# Patient Record
Sex: FEMALE | Race: WHITE | NOT HISPANIC OR LATINO | Employment: OTHER | URBAN - METROPOLITAN AREA
[De-identification: names, ages, dates, MRNs, and addresses within clinical notes are randomized per-mention and may not be internally consistent; named-entity substitution may affect disease eponyms.]

---

## 2020-11-09 ENCOUNTER — TRANSCRIBE ORDERS (OUTPATIENT)
Dept: ADMINISTRATIVE | Facility: HOSPITAL | Age: 70
End: 2020-11-09

## 2020-11-09 ENCOUNTER — HOSPITAL ENCOUNTER (OUTPATIENT)
Dept: RADIOLOGY | Facility: HOSPITAL | Age: 70
Discharge: HOME | End: 2020-11-09
Payer: MEDICARE

## 2020-11-09 DIAGNOSIS — M54.2 NECK PAIN: Primary | ICD-10-CM

## 2020-11-09 DIAGNOSIS — M54.2 NECK PAIN: ICD-10-CM

## 2020-11-09 PROCEDURE — 72050 X-RAY EXAM NECK SPINE 4/5VWS: CPT

## 2020-12-26 ENCOUNTER — PREP FOR PROCEDURE (OUTPATIENT)
Dept: GASTROENTEROLOGY | Facility: CLINIC | Age: 70
End: 2020-12-26

## 2020-12-26 DIAGNOSIS — Z12.11 SCREENING FOR COLON CANCER: Primary | ICD-10-CM

## 2021-01-20 ENCOUNTER — ANESTHESIA (OUTPATIENT)
Dept: GASTROENTEROLOGY | Facility: AMBULATORY SURGERY CENTER | Age: 71
End: 2021-01-20
Payer: MEDICARE

## 2021-01-20 ENCOUNTER — ANESTHESIA EVENT (OUTPATIENT)
Dept: GASTROENTEROLOGY | Facility: AMBULATORY SURGERY CENTER | Age: 71
End: 2021-01-20

## 2021-01-20 ENCOUNTER — HOSPITAL ENCOUNTER (OUTPATIENT)
Dept: GASTROENTEROLOGY | Facility: AMBULATORY SURGERY CENTER | Age: 71
Discharge: HOME | End: 2021-01-20
Payer: MEDICARE

## 2021-01-20 VITALS
DIASTOLIC BLOOD PRESSURE: 77 MMHG | TEMPERATURE: 96.8 F | OXYGEN SATURATION: 95 % | HEART RATE: 72 BPM | RESPIRATION RATE: 18 BRPM | SYSTOLIC BLOOD PRESSURE: 112 MMHG

## 2021-01-20 DIAGNOSIS — Z12.11 SCREENING FOR COLON CANCER: ICD-10-CM

## 2021-01-20 PROBLEM — E78.5 HYPERLIPIDEMIA: Status: ACTIVE | Noted: 2021-01-20

## 2021-01-20 PROBLEM — F39 MOOD DISORDER (HCC): Status: ACTIVE | Noted: 2021-01-20

## 2021-01-20 PROCEDURE — 00812 ANES LWR INTST SCR COLSC: CPT | Performed by: NURSE ANESTHETIST, CERTIFIED REGISTERED

## 2021-01-20 PROCEDURE — G0121 COLON CA SCRN NOT HI RSK IND: HCPCS | Performed by: INTERNAL MEDICINE

## 2021-01-20 RX ORDER — PROPOFOL 10 MG/ML
INJECTION, EMULSION INTRAVENOUS AS NEEDED
Status: DISCONTINUED | OUTPATIENT
Start: 2021-01-20 | End: 2021-01-20

## 2021-01-20 RX ORDER — SODIUM CHLORIDE 9 MG/ML
20 INJECTION, SOLUTION INTRAVENOUS CONTINUOUS
Status: DISCONTINUED | OUTPATIENT
Start: 2021-01-20 | End: 2021-01-24 | Stop reason: HOSPADM

## 2021-01-20 RX ORDER — ATORVASTATIN CALCIUM 10 MG/1
10 TABLET, FILM COATED ORAL DAILY
COMMUNITY

## 2021-01-20 RX ORDER — ESCITALOPRAM OXALATE 20 MG/1
20 TABLET ORAL DAILY
COMMUNITY

## 2021-01-20 RX ORDER — DIPHENOXYLATE HYDROCHLORIDE AND ATROPINE SULFATE 2.5; .025 MG/1; MG/1
1 TABLET ORAL DAILY
COMMUNITY

## 2021-01-20 RX ORDER — SODIUM CHLORIDE 9 MG/ML
INJECTION, SOLUTION INTRAVENOUS CONTINUOUS PRN
Status: DISCONTINUED | OUTPATIENT
Start: 2021-01-20 | End: 2021-01-20

## 2021-01-20 RX ADMIN — PROPOFOL 150 MG: 10 INJECTION, EMULSION INTRAVENOUS at 09:55

## 2021-01-20 RX ADMIN — PROPOFOL 150 MG: 10 INJECTION, EMULSION INTRAVENOUS at 09:49

## 2021-01-20 RX ADMIN — PROPOFOL 100 MG: 10 INJECTION, EMULSION INTRAVENOUS at 10:00

## 2021-01-20 RX ADMIN — SODIUM CHLORIDE: 9 INJECTION, SOLUTION INTRAVENOUS at 09:46

## 2021-01-20 NOTE — DISCHARGE INSTRUCTIONS
High Fiber Diet   WHAT YOU NEED TO KNOW:   What is a high-fiber diet? A high-fiber diet includes foods that have a high amount of fiber  Fiber is the part of fruits, vegetables, and grains that is not broken down by your body  Fiber keeps your bowel movements regular  Fiber can also help lower your cholesterol level, control blood sugar in people with diabetes, and relieve constipation  Fiber can also help you control your weight because it helps you feel full faster  Most adults should eat 25 to 35 grams of fiber each day  Talk to your dietitian or healthcare provider about the amount of fiber you need  What foods are good sources of fiber? · Foods with at least 4 grams of fiber per serving:      ? ? to ½ cup of high-fiber cereal (check the nutrition label on the box)    ? ½ cup of blackberries or raspberries    ? 4 dried prunes    ? 1 cooked artichoke    ? ½ cup of cooked legumes, such as lentils, or red, kidney, and freed beans    · Foods with 1 to 3 grams of fiber per serving:      ? 1 slice of whole-wheat, pumpernickel, or rye bread    ? ½ cup of cooked brown rice    ? 4 whole-wheat crackers    ? 1 cup of oatmeal    ? ½ cup of cereal with 1 to 3 grams of fiber per serving (check the nutrition label on the box)    ? 1 small piece of fruit, such as an apple, banana, pear, kiwi, or orange    ? 3 dates    ? ½ cup of canned apricots, fruit cocktail, peaches, or pears    ? ½ cup of raw or cooked vegetables, such as carrots, cauliflower, cabbage, spinach, squash, or corn  What are some ways that I can increase fiber in my diet? · Choose brown or wild rice instead of white rice  · Use whole wheat flour in recipes instead of white or all-purpose flour  · Add beans and peas to casseroles or soups  · Choose fresh fruit and vegetables with peels or skins on instead of juices  What other guidelines should I follow? · Add fiber to your diet slowly    You may have abdominal discomfort, bloating, and gas if you add fiber to your diet too quickly  · Drink plenty of liquids as you add fiber to your diet  You may have nausea or develop constipation if you do not drink enough water  Ask how much liquid to drink each day and which liquids are best for you  CARE AGREEMENT:   You have the right to help plan your care  Discuss treatment options with your healthcare provider to decide what care you want to receive  You always have the right to refuse treatment  The above information is an  only  It is not intended as medical advice for individual conditions or treatments  Talk to your doctor, nurse or pharmacist before following any medical regimen to see if it is safe and effective for you  © Copyright [address] Information is for End User's use only and may not be sold, redistributed or otherwise used for commercial purposes  All illustrations and images included in CareNotes® are the copyrighted property of [employer]  or [facility]   Hemorrhoids   WHAT YOU NEED TO KNOW:   What are hemorrhoids? Hemorrhoids are swollen blood vessels inside your rectum (internal hemorrhoids) or on your anus (external hemorrhoids)  Sometimes a hemorrhoid may prolapse  This means it extends out of your anus  What increases my risk for hemorrhoids? · Pregnancy or obesity    · Straining or sitting for a long time during bowel movements    · Liver disease    · Weak muscles around the anus caused by older age, rectal surgery, or anal intercourse    · A lack of physical activity    · Chronic diarrhea or constipation    · A low-fiber diet    What are the signs and symptoms of hemorrhoids?    · Pain or itching around your anus or inside your rectum    · Swelling or bumps around your anus    · Bright red blood in your bowel movement, on the toilet paper, or in the toilet bowl    · Tissue bulging out of your anus (prolapsed hemorrhoids)    · Incontinence (poor control over urine or bowel movements)    How are hemorrhoids diagnosed? Your healthcare provider will ask about your symptoms, the foods you eat, and your bowel movements  He or she will examine your anus for external hemorrhoids  You may need the following:  · A digital rectal exam  is a test to check for hemorrhoids  Your healthcare provider will put a gloved finger inside your anus to feel for the hemorrhoids  · An anoscopy  is a test that uses a scope (small tube with a light and camera on the end) to look at your hemorrhoids  How are hemorrhoids treated? Treatment will depend on your symptoms  You may need any of the following:  · Medicines  can help decrease pain and swelling, and soften your bowel movement  The medicine may be a pill, pad, cream, or ointment  · Procedures  may be used to shrink or remove your hemorrhoid  Examples include rubber-band ligation, sclerotherapy, and photocoagulation  These procedures may be done in your healthcare provider's office  Ask your healthcare provider for more information about these procedures  · Surgery  may be needed to shrink or remove your hemorrhoids  How can I manage my symptoms? · Apply ice on your anus for 15 to 20 minutes every hour or as directed  Use an ice pack, or put crushed ice in a plastic bag  Cover it with a towel before you apply it to your anus  Ice helps prevent tissue damage and decreases swelling and pain  · Take a sitz bath  Fill a bathtub with 4 to 6 inches of warm water  You may also use a sitz bath pan that fits inside a toilet bowl  Sit in the sitz bath for 15 minutes  Do this 3 times a day, and after each bowel movement  The warm water can help decrease pain and swelling  · Keep your anal area clean  Gently wash the area with warm water daily  Soap may irritate the area  After a bowel movement, wipe with moist towelettes or wet toilet paper  Dry toilet paper can irritate the area  How can I help prevent hemorrhoids?    · Do not strain to have a bowel movement  Do not sit on the toilet too long  These actions can increase pressure on the tissues in your rectum and anus  · Drink plenty of liquids  Liquids can help prevent constipation  Ask how much liquid to drink each day and which liquids are best for you  · Eat a variety of high-fiber foods  Examples include fruits, vegetables, and whole grains  Ask your healthcare provider how much fiber you need each day  You may need to take a fiber supplement  · Exercise as directed  Exercise, such as walking, may make it easier to have a bowel movement  Ask your healthcare provider to help you create an exercise plan  · Do not have anal sex  Anal sex can weaken the skin around your rectum and anus  · Avoid heavy lifting  This can cause straining and increase your risk for another hemorrhoid  When should I seek immediate care? · You have severe pain in your rectum or around your anus  · You have severe pain in your abdomen and you are vomiting  · You have bleeding from your anus that soaks through your underwear  When should I contact my healthcare provider? · You have frequent and painful bowel movements  · Your hemorrhoid looks or feels more swollen than usual      · You do not have a bowel movement for 2 days or more  · You see or feel tissue coming through your anus  · You have questions or concerns about your condition or care  CARE AGREEMENT:   You have the right to help plan your care  Learn about your health condition and how it may be treated  Discuss treatment options with your healthcare providers to decide what care you want to receive  You always have the right to refuse treatment  The above information is an  only  It is not intended as medical advice for individual conditions or treatments  Talk to your doctor, nurse or pharmacist before following any medical regimen to see if it is safe and effective for you    © Copyright [address] Information is for [employer] use only and may not be sold, redistributed or otherwise used for commercial purposes  All illustrations and images included in CareNotes® are the copyrighted property of [employer] M [employer] Inc  or [employer]  Diverticulosis   WHAT YOU NEED TO KNOW:   What is diverticulosis? Diverticulosis is a condition that causes small pockets called diverticula to form in your intestine  These pockets make it difficult for bowel movements to pass through your digestive system  What causes diverticulosis? Diverticula form when muscles have to work hard to move bowel movements through the intestine  The force causes bulges to form at weak areas in the intestine  This may happen if you eat foods that are low in fiber  Fiber helps give your bowel movements more bulk so they are larger and easier to move through your colon  The following may increase your risk of diverticulosis:  · A history of constipation    · Age 36 or older    · Obesity    · Lack of exercise    What are the signs and symptoms of diverticulosis? Diverticulosis usually does not cause any signs or symptoms  It may cause any of the following in some people:  · Pain or discomfort in your lower abdomen    · Abdominal bloating    · Constipation or diarrhea    How is diverticulosis diagnosed? Your healthcare provider will examine you and ask about your bowel movements, diet, and symptoms  He or she will also ask about any medical conditions you have or medicines you take  You may need any of the following:  · Blood tests  may be done to check for signs of inflammation  · A barium enema  is an x-ray of your colon that may show diverticula  A tube is put into your anus, and a liquid called barium is put through the tube  Barium is used so that healthcare providers can see your colon more clearly  · Flexible sigmoidoscopy  is a test to look for any changes in your lower intestines and rectum   It may also show the cause of any bleeding or pain  A soft, bendable tube with a light on the end will be put into your anus  It will then be moved forward into your intestine  · A colonoscopy  is used to look at your whole colon  A scope (long bendable tube with a light on the end) is used to take pictures  This test may show diverticula  · A CT scan , or CAT scan, may show diverticula  You may be given contrast liquid before the scan  Tell the healthcare provider if you have ever had an allergic reaction to contrast liquid  How is diverticulosis managed? The goal of treatment is to manage any symptoms you have and prevent other problems such as diverticulitis  Diverticulitis is swelling or infection of the diverticula  Your healthcare provider may recommend any of the following:  · Eat a variety of high-fiber foods  High-fiber foods help you have regular bowel movements  High-fiber foods include cooked beans, fruits, vegetables, and some cereals  Most adults need 25 to 35 grams of fiber each day  Your healthcare provider may recommend that you have more  Ask your healthcare provider how much fiber you need  Increase fiber slowly  You may have abdominal discomfort, bloating, and gas if you add fiber to your diet too quickly  You may need to take a fiber supplement if you are not getting enough fiber from food  · Medicines  to soften your bowel movements may be given  You may also need medicines to treat symptoms such as bloating and pain  · Drink liquids as directed  You may need to drink 2 to 3 liters (8 to 12 cups) of liquids every day  Ask your healthcare provider how much liquid to drink each day and which liquids are best for you  · Apply heat  on your abdomen for 20 to 30 minutes every 2 hours for as many days as directed  Heat helps decrease pain and muscle spasms  How can I help prevent diverticulitis or other symptoms?   The following may help decrease your risk for diverticulitis or symptoms, such as bleeding  Talk to your provider about these or other things you can do to prevent problems that may occur with diverticulosis  · Exercise regularly  Ask your healthcare provider about the best exercise plan for you  Exercise can help you have regular bowel movements  Get 30 minutes of exercise on most days of the week  · Maintain a healthy weight  Ask your healthcare provider how much you should weigh  Ask him or her to help you create a weight loss plan if you are overweight  · Do not smoke  Nicotine and other chemicals in cigarettes increase your risk for diverticulitis  Ask your healthcare provider for information if you currently smoke and need help to quit  E-cigarettes or smokeless tobacco still contain nicotine  Talk to your healthcare provider before you use these products  · Ask your healthcare provider if it is safe to take NSAIDs  NSAIDs may increase your risk of diverticulitis  When should I seek immediate care? · You have severe pain on the left side of your lower abdomen  · Your bowel movements are bright or dark red  When should I contact my healthcare provider? · You have a fever and chills  · You feel dizzy or lightheaded  · You have nausea, or you are vomiting  · You have a change in your bowel movements  · You have questions or concerns about your condition or care  CARE AGREEMENT:   You have the right to help plan your care  Learn about your health condition and how it may be treated  Discuss treatment options with your healthcare providers to decide what care you want to receive  You always have the right to refuse treatment  The above information is an  only  It is not intended as medical advice for individual conditions or treatments  Talk to your doctor, nurse or pharmacist before following any medical regimen to see if it is safe and effective for you    © Copyright [employer] 2020 Information is for End User's use only and may not be sold, redistributed or otherwise used for commercial purposes   All illustrations and images included in CareNotes® are the copyrighted property of A D A M , Inc  or Judi Newton

## 2021-01-20 NOTE — H&P
History and Physical - [facility]  Bay Currie Naresh 79 y o  female MRN: [id number]                  HPI: Hilda Martinez is a 79y o  year old female who presents for colonoscopy for screening purposes last colonoscopy 2010      REVIEW OF SYSTEMS: Per the HPI, and otherwise unremarkable  Historical Information   Past Medical History:   Diagnosis Date    Diverticulitis large intestine several years    Hyperlipidemia      Past Surgical History:   Procedure Laterality Date    APPENDECTOMY      BREAST SURGERY      lrft breast bx    HYSTERECTOMY      TONSILLECTOMY       Social History   Social History     Substance and Sexual Activity   Alcohol Use Yes    Frequency: 2-3 times a week    Drinks per session: 1 or 2    Binge frequency: Never     Social History     Substance and Sexual Activity   Drug Use Never     Social History     Tobacco Use   Smoking Status Never Smoker   Smokeless Tobacco Never Used     History reviewed  No pertinent family history  Meds/Allergies       Current Outpatient Medications:     atorvastatin (LIPITOR) 10 mg tablet    escitalopram (LEXAPRO) 20 mg tablet    multivitamin (THERAGRAN) TABS    No Known Allergies    Objective     /83   Pulse 77   Temp (!) 96 8 °F (36 °C) (Skin)   Resp 18   SpO2 97%       PHYSICAL EXAM    Gen: NAD  CV: RRR  CHEST: Clear  ABD: soft, NT/ND  EXT: no edema      ASSESSMENT/PLAN:  This is a 79y o  year old female here for colonoscopy, and she is stable and optimized for her procedure

## 2021-01-20 NOTE — INTERVAL H&P NOTE
H&P reviewed  After examining the patient I find no changes in the patients condition since the H&P had been written      Vitals:    01/20/21 0904   BP: 143/83   Pulse: 77   Resp: 18   Temp: (!) 96 8 °F (36 °C)   SpO2: 97%

## 2021-01-20 NOTE — ANESTHESIA PREPROCEDURE EVALUATION
Procedure:  COLONOSCOPY    Relevant Problems   CARDIO   (+) Hyperlipidemia        Physical Exam    Airway    Mallampati score: II  TM Distance: >3 FB  Neck ROM: full     Dental   No notable dental hx     Cardiovascular  Rhythm: regular, Rate: normal,     Pulmonary  Breath sounds clear to auscultation,     Other Findings        Anesthesia Plan  ASA Score- 2     Anesthesia Type- general and IV sedation with anesthesia with ASA Monitors  Additional Monitors:   Airway Plan:           Plan Factors-    Chart reviewed  EKG reviewed  Existing labs reviewed  Patient summary reviewed  Patient is not a current smoker  Patient did not smoke on day of surgery  Induction- intravenous      Postoperative Plan-     Informed Consent-

## 2021-02-13 DIAGNOSIS — Z23 ENCOUNTER FOR IMMUNIZATION: ICD-10-CM

## 2021-02-15 ENCOUNTER — IMMUNIZATIONS (OUTPATIENT)
Dept: FAMILY MEDICINE CLINIC | Facility: HOSPITAL | Age: 71
End: 2021-02-15

## 2021-02-15 DIAGNOSIS — Z23 ENCOUNTER FOR IMMUNIZATION: Primary | ICD-10-CM

## 2021-02-15 PROCEDURE — 91300 SARS-COV-2 / COVID-19 MRNA VACCINE (PFIZER-BIONTECH) 30 MCG: CPT

## 2021-02-15 PROCEDURE — 0001A SARS-COV-2 / COVID-19 MRNA VACCINE (PFIZER-BIONTECH) 30 MCG: CPT

## 2021-03-08 ENCOUNTER — IMMUNIZATIONS (OUTPATIENT)
Dept: FAMILY MEDICINE CLINIC | Facility: HOSPITAL | Age: 71
End: 2021-03-08

## 2021-03-08 DIAGNOSIS — Z23 ENCOUNTER FOR IMMUNIZATION: Primary | ICD-10-CM

## 2021-03-08 PROCEDURE — 0002A SARS-COV-2 / COVID-19 MRNA VACCINE (PFIZER-BIONTECH) 30 MCG: CPT

## 2021-03-08 PROCEDURE — 91300 SARS-COV-2 / COVID-19 MRNA VACCINE (PFIZER-BIONTECH) 30 MCG: CPT

## 2021-04-26 DIAGNOSIS — B34.9 VIRAL SYNDROME: ICD-10-CM

## 2021-04-26 PROCEDURE — U0003 INFECTIOUS AGENT DETECTION BY NUCLEIC ACID (DNA OR RNA); SEVERE ACUTE RESPIRATORY SYNDROME CORONAVIRUS 2 (SARS-COV-2) (CORONAVIRUS DISEASE [COVID-19]), AMPLIFIED PROBE TECHNIQUE, MAKING USE OF HIGH THROUGHPUT TECHNOLOGIES AS DESCRIBED BY CMS-2020-01-R: HCPCS | Performed by: INTERNAL MEDICINE

## 2021-04-26 PROCEDURE — U0005 INFEC AGEN DETEC AMPLI PROBE: HCPCS | Performed by: INTERNAL MEDICINE

## 2021-04-27 LAB — SARS-COV-2 RNA RESP QL NAA+PROBE: POSITIVE

## 2021-09-14 ENCOUNTER — OFFICE VISIT (OUTPATIENT)
Dept: URGENT CARE | Facility: CLINIC | Age: 71
End: 2021-09-14
Payer: MEDICARE

## 2021-09-14 VITALS
DIASTOLIC BLOOD PRESSURE: 70 MMHG | BODY MASS INDEX: 26.4 KG/M2 | SYSTOLIC BLOOD PRESSURE: 141 MMHG | HEIGHT: 63 IN | RESPIRATION RATE: 16 BRPM | WEIGHT: 149 LBS | HEART RATE: 68 BPM

## 2021-09-14 DIAGNOSIS — M54.40 BACK PAIN OF LUMBAR REGION WITH SCIATICA: Primary | ICD-10-CM

## 2021-09-14 PROCEDURE — 99213 OFFICE O/P EST LOW 20 MIN: CPT | Performed by: PHYSICIAN ASSISTANT

## 2021-09-14 PROCEDURE — G0463 HOSPITAL OUTPT CLINIC VISIT: HCPCS | Performed by: PHYSICIAN ASSISTANT

## 2021-09-14 RX ORDER — LIDOCAINE 50 MG/G
1 PATCH TOPICAL DAILY
Qty: 15 PATCH | Refills: 0 | Status: SHIPPED | OUTPATIENT
Start: 2021-09-14

## 2021-09-14 RX ORDER — METHOCARBAMOL 500 MG/1
500 TABLET, FILM COATED ORAL
Qty: 14 TABLET | Refills: 0 | Status: SHIPPED | OUTPATIENT
Start: 2021-09-14

## 2021-09-14 NOTE — PROGRESS NOTES
[facility]        NAME: Israel Mina is a 70 y o  female  : 1950    MRN: [id number]  DATE: 2021  TIME: 9:02 AM    Assessment and Plan   Back pain of lumbar region with sciatica [M54 40]  1  Back pain of lumbar region with sciatica  methocarbamol (ROBAXIN) 500 mg tablet    Ambulatory Referral to Comprehensive Spine Program         Patient Instructions      Began taking muscle relaxer  Only take before bedtime as it can cause drowsiness   continue with over-the-counter medication alternating between Tylenol ibuprofen as needed help with pain  Gentle range-of-motion stretching, heat to the area, gentle massage  Follow-up with primary care physician 3-5 days for continue symptoms  Follow-up with comprehensive spine program as discussed for continued symptoms  Follow up with PCP in 3-5 days  Proceed to  ER if symptoms worsen  Chief Complaint     Chief Complaint   Patient presents with    Back Pain     Starts in lower L back and radiates down buttocks and leg         History of Present Illness       70year old female presents to the office for c/o of left sided back pain with radiation down the back of the left leg  Patient denies any specific injury to the area  She does note she was working out in [employer]  She has been trying to search the area as well as using over-the-counter NSAIDs with mild relief  Patient notes mild numbness in distribution of the pain at times   notes mild difficulty with gait  Denies any loss of bowel or bladder function, fevers or chills  Otherwise is feeling well     Back Pain  This is a new problem  The current episode started in the past 7 days (x4 days )  The problem occurs daily  The pain is present in the gluteal and lumbar spine  The quality of the pain is described as aching  Radiates to: posterior left leg  The pain is at a severity of 7/10  The symptoms are aggravated by bending  Associated symptoms include numbness   Pertinent negatives include no abdominal pain, bladder incontinence, bowel incontinence, chest pain, fever, paresthesias, perianal numbness or tingling  She has tried NSAIDs, ice and heat for the symptoms  The treatment provided mild relief  Review of Systems   Review of Systems   Constitutional: Negative for chills and fever  Respiratory: Negative for cough, chest tightness and shortness of breath  Cardiovascular: Negative for chest pain and palpitations  Gastrointestinal: Negative for abdominal pain and bowel incontinence  Genitourinary: Negative  Negative for bladder incontinence  Musculoskeletal: Positive for back pain, gait problem (limp at times ) and myalgias  Skin: Negative  Neurological: Positive for numbness  Negative for tingling and paresthesias  Current Medications       Current Outpatient Medications:     atorvastatin (LIPITOR) 10 mg tablet, Take 10 mg by mouth daily, Disp: , Rfl:     escitalopram (LEXAPRO) 20 mg tablet, Take 20 mg by mouth daily, Disp: , Rfl:     multivitamin (THERAGRAN) TABS, Take 1 tablet by mouth daily, Disp: , Rfl:     methocarbamol (ROBAXIN) 500 mg tablet, Take 1 tablet (500 mg total) by mouth daily at bedtime as needed for muscle spasms (back pain), Disp: 14 tablet, Rfl: 0    Current Allergies     Allergies as of 09/14/2021    (No Known Allergies)            The following portions of the patient's history were reviewed and updated as appropriate: allergies, current medications, past family history, past medical history, past social history, past surgical history and problem list      Past Medical History:   Diagnosis Date    Diverticulitis large intestine several years    Hyperlipidemia        Past Surgical History:   Procedure Laterality Date    APPENDECTOMY      BREAST SURGERY      lrft breast bx    HYSTERECTOMY      TONSILLECTOMY         No family history on file  Medications have been verified          Objective   /70 (Patient Position: Sitting)   Pulse 68   Resp 16   Ht 5' 3" (1 6 m)   Wt 67 6 kg (149 lb)   BMI 26 39 kg/m²   No LMP recorded  Patient has had a hysterectomy  Physical Exam     Physical Exam  Vitals and nursing note reviewed  Constitutional:       General: She is not in acute distress  Appearance: She is well-developed  HENT:      Head: Normocephalic and atraumatic  Right Ear: Hearing and external ear normal       Left Ear: Hearing and external ear normal       Nose: Nose normal    Eyes:      Conjunctiva/sclera: Conjunctivae normal       Pupils: Pupils are equal, round, and reactive to light  Cardiovascular:      Rate and Rhythm: Normal rate and regular rhythm  Pulses: Normal pulses  Heart sounds: Normal heart sounds  No murmur heard  No friction rub  No gallop  Pulmonary:      Effort: Pulmonary effort is normal  No respiratory distress  Breath sounds: Normal breath sounds  No wheezing or rales  Musculoskeletal:         General: No tenderness or deformity  Normal range of motion  Cervical back: Normal range of motion  Lumbar back: Spasms present  No swelling, lacerations or bony tenderness  Normal range of motion  Negative right straight leg raise test and negative left straight leg raise test         Back:       Comments:  Patient able walk on toes and on heels without difficulty  Straight leg raise negative bilaterally  Sensation intact to crude touch in bilateral lower extremities  5/5 strength in bilateral lower extremities   Skin:     General: Skin is warm and dry  Capillary Refill: Capillary refill takes less than 2 seconds  Findings: No ecchymosis, erythema or laceration  Neurological:      Mental Status: She is alert  Sensory: No sensory deficit  Motor: No abnormal muscle tone  Deep Tendon Reflexes: Reflexes normal    Psychiatric:         Behavior: Behavior is cooperative

## 2021-09-14 NOTE — PATIENT INSTRUCTIONS
Began taking muscle relaxer  Only take before bedtime as it can cause drowsiness   continue with over-the-counter medication alternating between Tylenol ibuprofen as needed help with pain  Gentle range-of-motion stretching, heat to the area, gentle massage  Follow-up with primary care physician 3-5 days for continue symptoms  Follow-up with comprehensive spine program as discussed for continued symptoms  Sciatica   WHAT YOU NEED TO KNOW:   Sciatica is a condition that causes pain along your sciatic nerve  The sciatic nerve runs from your spine through both sides of your buttocks  It then runs down the back of your thigh, into your lower leg and foot  Your sciatic nerve may be compressed, inflamed, irritated, or stretched  DISCHARGE INSTRUCTIONS:   Medicines:   · NSAIDs:  These medicines decrease swelling and pain  NSAIDs are available without a doctor's order  Ask your healthcare provider which medicine is right for you  Ask how much to take and when to take it  Take as directed  NSAIDs can cause stomach bleeding or kidney problems if not taken correctly  · Acetaminophen: This medicine decreases pain  Acetaminophen is available without a doctor's order  Ask how much to take and when to take it  Follow directions  Acetaminophen can cause liver damage if not taken correctly  · Muscle relaxers  help decrease pain and muscle spasms  · Take your medicine as directed  Contact your healthcare provider if you think your medicine is not helping or if you have side effects  Tell him of her if you are allergic to any medicine  Keep a list of the medicines, vitamins, and herbs you take  Include the amounts, and when and why you take them  Bring the list or the pill bottles to follow-up visits  Carry your medicine list with you in case of an emergency  Follow up with your healthcare provider as directed:  Write down your questions so you remember to ask them during your visits     Manage your symptoms:   · Activity:  Decrease your activity  Do not lift heavy objects or twist your back for at least 6 weeks  Slowly return to your usual activity  · Ice:  Ice helps decrease swelling and pain  Ice may also help prevent tissue damage  Use an ice pack, or put crushed ice in a plastic bag  Cover it with a towel and place it on your low back or leg for 15 to 20 minutes every hour or as directed  · Heat:  Heat helps decrease pain and muscle spasms  Apply heat on the area for 20 to 30 minutes every 2 hours for as many days as directed  · Physical therapy:  You may need to see physical therapist to teach you exercises to help improve movement and strength, and to decrease pain  An occupational therapist teaches you skills to help with your daily activities  · Use assistive devices if directed: You may need to wear back support, such as a back brace  You may need crutches, a cane, or a walker to decrease stress on your lower back and leg muscles  Ask your healthcare provider for more information about assistive devices and how to use them correctly  Self-care:   · Avoid pressure on your back and legs:  Do not  lift heavy objects, or stand or sit for long periods of time  · Lift objects safely:  Keep your back straight and bend your knees when you  an object  Do not bend or twist your back when you lift  · Maintain a healthy weight:  Ask your healthcare provider how much you should weigh  Ask him to help you create a weight loss plan if you are overweight  · Exercise:  Ask your healthcare provider about the best stretching, warmup, and exercise plan for you  Contact your healthcare provider if:   · You have pain in your lower back at night or when resting  · You have pain in your lower back with numbness below the knee  · You have weakness in one leg only  · You have questions or concerns about your condition or care      Return to the emergency department if:   · You have trouble holding back your urine or bowel movements  · You have weakness in both legs  · You have numbness in your groin or buttocks  © Copyright [employer] 2021 Information is for End User's use only and may not be sold, redistributed or otherwise used for commercial purposes  All illustrations and images included in CareNotes® are the copyrighted property of [employer]  or Judi Rose   The above information is an  only  It is not intended as medical advice for individual conditions or treatments  Talk to your doctor, nurse or pharmacist before following any medical regimen to see if it is safe and effective for you  Lower Back Exercises   WHAT YOU NEED TO KNOW:   Lower back exercises help heal and strengthen your back muscles to prevent another injury  Ask your healthcare provider if you need to see a physical therapist for more advanced exercises  DISCHARGE INSTRUCTIONS:   Return to the emergency department if:   · You have severe pain that prevents you from moving  Contact your healthcare provider if:   · Your pain becomes worse  · You have new pain  · You have questions or concerns about your condition or care  Do lower back exercises safely:   · Do the exercises on a mat or firm surface  (not on a bed) to support your spine and prevent low back pain  · Move slowly and smoothly  Avoid fast or jerky motions  · Breathe normally  Do not hold your breath  · Stop if you feel pain  It is normal to feel some discomfort at first  Regular exercise will help decrease your discomfort over time  Lower back exercises: Your healthcare provider may recommend that you do back exercises 10 to 30 minutes each day  He may also recommend that you do exercises 1 to 3 times each day  Ask your healthcare provider which exercises are best for you and how often to do them  · Ankle pumps:  Lie on your back   Move your foot up (with your toes pointing toward your head)  Then, move your foot down (with your toes pointing away from you)  Repeat this exercise 10 times on each side  · Heel slides:  Lie on your back  Slowly bend one leg and then straighten it  Next, bend the other leg and then straighten it  Repeat 10 times on each side  · Pelvic tilt:  Lie on your back with your knees bent and feet flat on the floor  Place your arms in a relaxed position beside your body  Tighten the muscles of your abdomen and flatten your back against the floor  Hold for 5 seconds  Repeat 5 times  · Back stretch:  Lie on your back with your hands behind your head  Bend your knees and turn the lower half of your body to one side  Hold this position for 10 seconds  Repeat 3 times on each side  · Straight leg raises:  Lie on your back with one leg straight  Bend the other knee  Tighten your abdomen and then slowly lift the straight leg up about 6 to 12 inches off the floor  Hold for 1 to 5 seconds  Lower your leg slowly  Repeat 10 times on each leg  · Knee-to-chest:  Lie on your back with your knees bent and feet flat on the floor  Pull one of your knees toward your chest and hold it there for 5 seconds  Return your leg to the starting position  Lift the other knee toward your chest and hold for 5 seconds  Do this 5 times on each side  · Cat and camel:  Place your hands and knees on the floor  Arch your back upward toward the ceiling and lower your head  Round out your spine as much as you can  Hold for 5 seconds  Lift your head upward and push your chest downward toward the floor  Hold for 5 seconds  Do 3 sets or as directed  · Wall squats:  Stand with your back against a wall  Tighten the muscles of your abdomen  Slowly lower your body until your knees are bent at a 45 degree angle  Hold this position for 5 seconds  Slowly move back up to a standing position  Repeat 10 times           · Curl up:  Lie on your back with your knees bent and feet flat on the floor  Place your hands, palms down, underneath the curve in your lower back  Next, with your elbows on the floor, lift your shoulders and chest 2 to 3 inches  Keep your head in line with your shoulders  Hold this position for 5 seconds  When you can do this exercise without pain for 10 to 15 seconds, you may add a rotation  While your shoulders and chest are lifted off the ground, turn slightly to the left and hold  Repeat on the other side  · Bird dog:  Place your hands and knees on the floor  Keep your wrists directly below your shoulders and your knees directly below your hips  Pull your belly button in toward your spine  Do not flatten or arch your back  Tighten your abdominal muscles  Raise one arm straight out so that it is aligned with your head  Next, raise the leg opposite your arm  Hold this position for 15 seconds  Lower your arm and leg slowly and change sides  Do 5 sets  © Copyright [employer] 2021 Information is for End User's use only and may not be sold, redistributed or otherwise used for commercial purposes  All illustrations and images included in CareNotes® are the copyrighted property of [employer]  or Judi Rose   The above information is an  only  It is not intended as medical advice for individual conditions or treatments  Talk to your doctor, nurse or pharmacist before following any medical regimen to see if it is safe and effective for you

## 2021-09-27 ENCOUNTER — TELEPHONE (OUTPATIENT)
Dept: PHYSICAL THERAPY | Facility: OTHER | Age: 71
End: 2021-09-27

## 2021-09-27 ENCOUNTER — APPOINTMENT (OUTPATIENT)
Dept: LAB | Facility: CLINIC | Age: 71
End: 2021-09-27
Payer: MEDICARE

## 2021-09-27 ENCOUNTER — HOSPITAL ENCOUNTER (OUTPATIENT)
Dept: RADIOLOGY | Facility: HOSPITAL | Age: 71
Discharge: HOME | End: 2021-09-27
Payer: MEDICARE

## 2021-09-27 DIAGNOSIS — M54.9 BACK PAIN, UNSPECIFIED BACK LOCATION, UNSPECIFIED BACK PAIN LATERALITY, UNSPECIFIED CHRONICITY: ICD-10-CM

## 2021-09-27 DIAGNOSIS — E78.5 HYPERLIPIDEMIA, UNSPECIFIED HYPERLIPIDEMIA TYPE: ICD-10-CM

## 2021-09-27 DIAGNOSIS — Z00.00 ROUTINE GENERAL MEDICAL EXAMINATION AT A HEALTH CARE FACILITY: ICD-10-CM

## 2021-09-27 DIAGNOSIS — E03.9 HYPOTHYROIDISM, ADULT: ICD-10-CM

## 2021-09-27 LAB
ALBUMIN SERPL BCP-MCNC: 4.1 G/DL (ref 3.5–5)
ALP SERPL-CCNC: 74 U/L (ref 46–116)
ALT SERPL W P-5'-P-CCNC: 35 U/L (ref 12–78)
ANION GAP SERPL CALCULATED.3IONS-SCNC: 7 MMOL/L (ref 4–13)
AST SERPL W P-5'-P-CCNC: 23 U/L (ref 5–45)
BASOPHILS # BLD AUTO: 0.03 THOUSANDS/ΜL (ref 0–0.1)
BASOPHILS NFR BLD AUTO: 1 % (ref 0–1)
BILIRUB SERPL-MCNC: 0.68 MG/DL (ref 0.2–1)
BUN SERPL-MCNC: 21 MG/DL (ref 5–25)
CALCIUM SERPL-MCNC: 8.8 MG/DL (ref 8.3–10.1)
CHLORIDE SERPL-SCNC: 107 MMOL/L (ref 100–108)
CHOLEST SERPL-MCNC: 177 MG/DL (ref 50–200)
CO2 SERPL-SCNC: 28 MMOL/L (ref 21–32)
CREAT SERPL-MCNC: 0.82 MG/DL (ref 0.6–1.3)
EOSINOPHIL # BLD AUTO: 0.14 THOUSAND/ΜL (ref 0–0.61)
EOSINOPHIL NFR BLD AUTO: 3 % (ref 0–6)
ERYTHROCYTE [DISTWIDTH] IN BLOOD BY AUTOMATED COUNT: 12.4 % (ref 11.6–15.1)
GFR SERPL CREATININE-BSD FRML MDRD: 72 ML/MIN/1.73SQ M
GLUCOSE P FAST SERPL-MCNC: 99 MG/DL (ref 65–99)
HCT VFR BLD AUTO: 40.5 % (ref 34.8–46.1)
HDLC SERPL-MCNC: 69 MG/DL
HGB BLD-MCNC: 13.2 G/DL (ref 11.5–15.4)
IMM GRANULOCYTES # BLD AUTO: 0.02 THOUSAND/UL (ref 0–0.2)
IMM GRANULOCYTES NFR BLD AUTO: 0 % (ref 0–2)
LDLC SERPL CALC-MCNC: 96 MG/DL (ref 0–100)
LYMPHOCYTES # BLD AUTO: 1.29 THOUSANDS/ΜL (ref 0.6–4.47)
LYMPHOCYTES NFR BLD AUTO: 29 % (ref 14–44)
MCH RBC QN AUTO: 31.2 PG (ref 26.8–34.3)
MCHC RBC AUTO-ENTMCNC: 32.6 G/DL (ref 31.4–37.4)
MCV RBC AUTO: 96 FL (ref 82–98)
MONOCYTES # BLD AUTO: 0.34 THOUSAND/ΜL (ref 0.17–1.22)
MONOCYTES NFR BLD AUTO: 8 % (ref 4–12)
NEUTROPHILS # BLD AUTO: 2.7 THOUSANDS/ΜL (ref 1.85–7.62)
NEUTS SEG NFR BLD AUTO: 59 % (ref 43–75)
NONHDLC SERPL-MCNC: 108 MG/DL
NRBC BLD AUTO-RTO: 0 /100 WBCS
PLATELET # BLD AUTO: 278 THOUSANDS/UL (ref 149–390)
PMV BLD AUTO: 10 FL (ref 8.9–12.7)
POTASSIUM SERPL-SCNC: 4.4 MMOL/L (ref 3.5–5.3)
PROT SERPL-MCNC: 7.4 G/DL (ref 6.4–8.2)
RBC # BLD AUTO: 4.23 MILLION/UL (ref 3.81–5.12)
SODIUM SERPL-SCNC: 142 MMOL/L (ref 136–145)
TRIGL SERPL-MCNC: 58 MG/DL
TSH SERPL DL<=0.05 MIU/L-ACNC: 3.18 UIU/ML (ref 0.36–3.74)
WBC # BLD AUTO: 4.52 THOUSAND/UL (ref 4.31–10.16)

## 2021-09-27 PROCEDURE — 84443 ASSAY THYROID STIM HORMONE: CPT

## 2021-09-27 PROCEDURE — 85025 COMPLETE CBC W/AUTO DIFF WBC: CPT

## 2021-09-27 PROCEDURE — 80061 LIPID PANEL: CPT

## 2021-09-27 PROCEDURE — 36415 COLL VENOUS BLD VENIPUNCTURE: CPT

## 2021-09-27 PROCEDURE — 80053 COMPREHEN METABOLIC PANEL: CPT

## 2021-09-27 PROCEDURE — 72110 X-RAY EXAM L-2 SPINE 4/>VWS: CPT

## 2021-12-11 ENCOUNTER — IMMUNIZATIONS (OUTPATIENT)
Dept: FAMILY MEDICINE CLINIC | Facility: HOSPITAL | Age: 71
End: 2021-12-11

## 2021-12-11 DIAGNOSIS — Z23 ENCOUNTER FOR IMMUNIZATION: Primary | ICD-10-CM

## 2021-12-11 PROCEDURE — 0001A COVID-19 PFIZER VACC 0.3 ML: CPT

## 2021-12-11 PROCEDURE — 91300 COVID-19 PFIZER VACC 0.3 ML: CPT

## 2022-03-13 ENCOUNTER — HOSPITAL ENCOUNTER (OUTPATIENT)
Dept: MRI IMAGING | Facility: HOSPITAL | Age: 72
Discharge: HOME | End: 2022-03-13
Payer: MEDICARE

## 2022-03-13 DIAGNOSIS — M54.9 DORSALGIA, UNSPECIFIED: ICD-10-CM

## 2022-03-13 PROCEDURE — 72148 MRI LUMBAR SPINE W/O DYE: CPT

## 2022-03-13 PROCEDURE — G1004 CDSM NDSC: HCPCS

## 2024-12-10 ENCOUNTER — APPOINTMENT (EMERGENCY)
Dept: CT IMAGING | Facility: HOSPITAL | Age: 74
End: 2024-12-10
Payer: MEDICARE

## 2024-12-10 ENCOUNTER — APPOINTMENT (EMERGENCY)
Dept: RADIOLOGY | Facility: HOSPITAL | Age: 74
End: 2024-12-10
Payer: MEDICARE

## 2024-12-10 ENCOUNTER — HOSPITAL ENCOUNTER (OUTPATIENT)
Facility: HOSPITAL | Age: 74
Setting detail: OBSERVATION
Discharge: HOME | End: 2024-12-11
Attending: EMERGENCY MEDICINE | Admitting: INTERNAL MEDICINE
Payer: MEDICARE

## 2024-12-10 DIAGNOSIS — R26.2 AMBULATORY DYSFUNCTION: ICD-10-CM

## 2024-12-10 DIAGNOSIS — R42 DIZZINESS: Primary | ICD-10-CM

## 2024-12-10 DIAGNOSIS — R42 VERTIGO: ICD-10-CM

## 2024-12-10 LAB
ALBUMIN SERPL BCG-MCNC: 4.2 G/DL (ref 3.5–5)
ALP SERPL-CCNC: 65 U/L (ref 34–104)
ALT SERPL W P-5'-P-CCNC: 28 U/L (ref 7–52)
ANION GAP SERPL CALCULATED.3IONS-SCNC: 8 MMOL/L (ref 4–13)
AST SERPL W P-5'-P-CCNC: 27 U/L (ref 13–39)
BASOPHILS # BLD AUTO: 0.02 THOUSANDS/ÂΜL (ref 0–0.1)
BASOPHILS NFR BLD AUTO: 0 % (ref 0–1)
BILIRUB SERPL-MCNC: 0.64 MG/DL (ref 0.2–1)
BUN SERPL-MCNC: 20 MG/DL (ref 5–25)
CALCIUM SERPL-MCNC: 9.8 MG/DL (ref 8.4–10.2)
CARDIAC TROPONIN I PNL SERPL HS: 4 NG/L (ref ?–50)
CHLORIDE SERPL-SCNC: 106 MMOL/L (ref 96–108)
CO2 SERPL-SCNC: 26 MMOL/L (ref 21–32)
CREAT SERPL-MCNC: 0.75 MG/DL (ref 0.6–1.3)
EOSINOPHIL # BLD AUTO: 0.07 THOUSAND/ÂΜL (ref 0–0.61)
EOSINOPHIL NFR BLD AUTO: 1 % (ref 0–6)
ERYTHROCYTE [DISTWIDTH] IN BLOOD BY AUTOMATED COUNT: 12.3 % (ref 11.6–15.1)
GFR SERPL CREATININE-BSD FRML MDRD: 78 ML/MIN/1.73SQ M
GLUCOSE SERPL-MCNC: 105 MG/DL (ref 65–140)
HCT VFR BLD AUTO: 38.5 % (ref 34.8–46.1)
HGB BLD-MCNC: 13 G/DL (ref 11.5–15.4)
IMM GRANULOCYTES # BLD AUTO: 0.02 THOUSAND/UL (ref 0–0.2)
IMM GRANULOCYTES NFR BLD AUTO: 0 % (ref 0–2)
LYMPHOCYTES # BLD AUTO: 1.38 THOUSANDS/ÂΜL (ref 0.6–4.47)
LYMPHOCYTES NFR BLD AUTO: 26 % (ref 14–44)
MCH RBC QN AUTO: 31.6 PG (ref 26.8–34.3)
MCHC RBC AUTO-ENTMCNC: 33.8 G/DL (ref 31.4–37.4)
MCV RBC AUTO: 93 FL (ref 82–98)
MONOCYTES # BLD AUTO: 0.4 THOUSAND/ÂΜL (ref 0.17–1.22)
MONOCYTES NFR BLD AUTO: 8 % (ref 4–12)
NEUTROPHILS # BLD AUTO: 3.44 THOUSANDS/ÂΜL (ref 1.85–7.62)
NEUTS SEG NFR BLD AUTO: 65 % (ref 43–75)
NRBC BLD AUTO-RTO: 0 /100 WBCS
PLATELET # BLD AUTO: 251 THOUSANDS/UL (ref 149–390)
PMV BLD AUTO: 9.5 FL (ref 8.9–12.7)
POTASSIUM SERPL-SCNC: 3.7 MMOL/L (ref 3.5–5.3)
PROT SERPL-MCNC: 7 G/DL (ref 6.4–8.4)
RBC # BLD AUTO: 4.12 MILLION/UL (ref 3.81–5.12)
SODIUM SERPL-SCNC: 140 MMOL/L (ref 135–147)
WBC # BLD AUTO: 5.33 THOUSAND/UL (ref 4.31–10.16)

## 2024-12-10 PROCEDURE — 99285 EMERGENCY DEPT VISIT HI MDM: CPT | Performed by: EMERGENCY MEDICINE

## 2024-12-10 PROCEDURE — 84484 ASSAY OF TROPONIN QUANT: CPT

## 2024-12-10 PROCEDURE — 71045 X-RAY EXAM CHEST 1 VIEW: CPT

## 2024-12-10 PROCEDURE — 85025 COMPLETE CBC W/AUTO DIFF WBC: CPT

## 2024-12-10 PROCEDURE — 70498 CT ANGIOGRAPHY NECK: CPT

## 2024-12-10 PROCEDURE — 93005 ELECTROCARDIOGRAM TRACING: CPT

## 2024-12-10 PROCEDURE — 96374 THER/PROPH/DIAG INJ IV PUSH: CPT

## 2024-12-10 PROCEDURE — 99222 1ST HOSP IP/OBS MODERATE 55: CPT | Performed by: INTERNAL MEDICINE

## 2024-12-10 PROCEDURE — 36415 COLL VENOUS BLD VENIPUNCTURE: CPT

## 2024-12-10 PROCEDURE — 99285 EMERGENCY DEPT VISIT HI MDM: CPT

## 2024-12-10 PROCEDURE — 70496 CT ANGIOGRAPHY HEAD: CPT

## 2024-12-10 PROCEDURE — 80053 COMPREHEN METABOLIC PANEL: CPT

## 2024-12-10 PROCEDURE — 96361 HYDRATE IV INFUSION ADD-ON: CPT

## 2024-12-10 RX ORDER — HYDROXYZINE HYDROCHLORIDE 25 MG/1
25 TABLET, FILM COATED ORAL ONCE
Status: DISCONTINUED | OUTPATIENT
Start: 2024-12-10 | End: 2024-12-10

## 2024-12-10 RX ORDER — DIAZEPAM 10 MG/2ML
5 INJECTION, SOLUTION INTRAMUSCULAR; INTRAVENOUS ONCE
Status: COMPLETED | OUTPATIENT
Start: 2024-12-10 | End: 2024-12-10

## 2024-12-10 RX ORDER — ENOXAPARIN SODIUM 100 MG/ML
40 INJECTION SUBCUTANEOUS DAILY
Status: DISCONTINUED | OUTPATIENT
Start: 2024-12-10 | End: 2024-12-11 | Stop reason: HOSPADM

## 2024-12-10 RX ORDER — ONDANSETRON 2 MG/ML
1 INJECTION INTRAMUSCULAR; INTRAVENOUS ONCE
Status: COMPLETED | OUTPATIENT
Start: 2024-12-10 | End: 2024-12-10

## 2024-12-10 RX ORDER — SODIUM CHLORIDE 9 MG/ML
3 INJECTION INTRAVENOUS
Status: DISCONTINUED | OUTPATIENT
Start: 2024-12-10 | End: 2024-12-11 | Stop reason: HOSPADM

## 2024-12-10 RX ORDER — ESCITALOPRAM OXALATE 20 MG/1
20 TABLET ORAL DAILY
Status: DISCONTINUED | OUTPATIENT
Start: 2024-12-10 | End: 2024-12-10

## 2024-12-10 RX ORDER — MECLIZINE HCL 12.5 MG 12.5 MG/1
25 TABLET ORAL ONCE
Status: COMPLETED | OUTPATIENT
Start: 2024-12-10 | End: 2024-12-10

## 2024-12-10 RX ORDER — ESCITALOPRAM OXALATE 10 MG/1
10 TABLET ORAL DAILY
COMMUNITY

## 2024-12-10 RX ORDER — ATORVASTATIN CALCIUM 10 MG/1
10 TABLET, FILM COATED ORAL
Status: DISCONTINUED | OUTPATIENT
Start: 2024-12-10 | End: 2024-12-11 | Stop reason: HOSPADM

## 2024-12-10 RX ORDER — ESCITALOPRAM OXALATE 10 MG/1
10 TABLET ORAL DAILY
Status: DISCONTINUED | OUTPATIENT
Start: 2024-12-11 | End: 2024-12-11 | Stop reason: HOSPADM

## 2024-12-10 RX ORDER — METHOCARBAMOL 500 MG/1
500 TABLET, FILM COATED ORAL
Status: DISCONTINUED | OUTPATIENT
Start: 2024-12-10 | End: 2024-12-11 | Stop reason: HOSPADM

## 2024-12-10 RX ORDER — MECLIZINE HCL 12.5 MG 12.5 MG/1
25 TABLET ORAL EVERY 8 HOURS SCHEDULED
Status: DISCONTINUED | OUTPATIENT
Start: 2024-12-10 | End: 2024-12-11 | Stop reason: HOSPADM

## 2024-12-10 RX ADMIN — B-COMPLEX W/ C & FOLIC ACID TAB 1 TABLET: TAB at 13:59

## 2024-12-10 RX ADMIN — MECLIZINE 25 MG: 12.5 TABLET ORAL at 21:08

## 2024-12-10 RX ADMIN — IOHEXOL 85 ML: 350 INJECTION, SOLUTION INTRAVENOUS at 09:23

## 2024-12-10 RX ADMIN — MECLIZINE 25 MG: 12.5 TABLET ORAL at 14:48

## 2024-12-10 RX ADMIN — SODIUM CHLORIDE 1000 ML: 0.9 INJECTION, SOLUTION INTRAVENOUS at 08:31

## 2024-12-10 RX ADMIN — MECLIZINE 25 MG: 12.5 TABLET ORAL at 07:42

## 2024-12-10 RX ADMIN — DIAZEPAM 5 MG: 10 INJECTION, SOLUTION INTRAMUSCULAR; INTRAVENOUS at 08:29

## 2024-12-10 RX ADMIN — ESCITALOPRAM OXALATE 20 MG: 20 TABLET ORAL at 13:59

## 2024-12-10 NOTE — ASSESSMENT & PLAN NOTE
Patient presenting with 2 days of intermittent positional spinning sensation with difficulty ambulating and nausea.  No other neurological deficits  Significant improvement in symptoms with meclizine and Valium in ED.  Still having trouble ambulating because of dizziness/vertigo when ambulating.  Patient lives by herself.  CTA head and neck without any acute abnormalities.  Suspect patient's symptoms most likely from peripheral vertigo.  She has known history of positional vertigo.  To be admitted for observation.  Will start the patient on scheduled meclizine 25 mg 3 times daily.  Will hold off on further brain imaging.  Low suspicion for stroke.  If continues to have symptoms tomorrow, can consider MRI brain.

## 2024-12-10 NOTE — ED ATTENDING ATTESTATION
12/10/2024  I, Aviva Benites MD, saw and evaluated the patient. I have discussed the patient with the resident/non-physician practitioner and agree with the resident's/non-physician practitioner's findings, Plan of Care, and MDM as documented in the resident's/non-physician practitioner's note, except where noted. All available labs and Radiology studies were reviewed.  I was present for key portions of any procedure(s) performed by the resident/non-physician practitioner and I was immediately available to provide assistance.       At this point I agree with the current assessment done in the Emergency Department.  I have conducted an independent evaluation of this patient a history and physical is as follows:      75 yo female with h/o HLD p/w dizziness/rooms spinning/feeling unsteady.  Has h/o intermittent positional vertigo in the past, usually prompted by head turning and position change, this is more persistent.  Reports symptoms began when putting in eye drops several days ago, symptoms intermittent initially then persistent today.  No double vision/loss of vision/difficulty speaking or forming words/new sensory changes/or uniltateral weakness.  Denies systemic complaints including headache, ear pain, sinus pain, fevers/chills, cp or SOB.  No  symptoms.  No belly or back pain.  On exam pt HD stable, 's.  Not tachycardic.  No CN findings or nystagmus noted.  Sensation/strength intact.  No cerebellar findings.  Pt does report worsening symptoms with head turning and position change only mildly improved with meclizine and valium.  Work up and imaging largely reassuring.  Ddx peripheral vertigo, less likely central etiology.  Given pt continues to feel unsteady will keep for observation.  HD stable throughout.      Past Medical History:   Diagnosis Date    Diverticulitis large intestine several years    Hyperlipidemia          ED Course  ED Course as of 12/10/24 1210   Tue Dec 10, 2024   0803 CBC and  differential  No significant leukocytosis reassuring diff, normal H/H, normal platelets.      0818 Comprehensive metabolic panel  Reassuring, no end organ damage, no AG, normal bicarb.       0822 hs TnI 0hr: 4  wnl   1003 CTA head and neck with and without contrast  IMPRESSION:     CT Brain: No acute intracranial abnormality. Mild chronic microangiopathic change.     CT Angiography: Hypoplastic left vertebral artery. No stenosis or occlusion of the cervical or intracranial vasculature.     Other findings: Smooth reversal of the normal cervical lordosis with mild cervical degenerative change.                 Workstation performed: [id number]         Exam Ended: 12/10/24 09:22       1041 X-ray chest 1 view portable  IMPRESSION:     No focal consolidation, pleural effusion, or pneumothorax.           Resident: Tyson Galeano     I, the attending radiologist, have reviewed the images and agree with the final report above.     Workstation performed: [id number]              Critical Care Time  Procedures

## 2024-12-10 NOTE — H&P
H&P - Hospitalist   Name: Shelia Infante 74 y.o. female I MRN: [id number]  Unit/Bed#: ED-41 I Date of Admission: 12/10/2024   Date of Service: 12/10/2024 I Hospital Day: 0     Assessment & Plan  Vertigo  Patient presenting with 2 days of intermittent positional spinning sensation with difficulty ambulating and nausea.  No other neurological deficits  Significant improvement in symptoms with meclizine and Valium in ED.  Still having trouble ambulating because of dizziness/vertigo when ambulating.  Patient lives by herself.  CTA head and neck without any acute abnormalities.  Suspect patient's symptoms most likely from peripheral vertigo.  She has known history of positional vertigo.  To be admitted for observation.  Will start the patient on scheduled meclizine 25 mg 3 times daily.  Will hold off on further brain imaging.  Low suspicion for stroke.  If continues to have symptoms tomorrow, can consider MRI brain.  Hyperlipidemia  Continue statin.  Mood disorder (HCC)  Continue Lexapro  Ambulatory dysfunction  Agreed to above.  PT evaluation.      VTE Pharmacologic Prophylaxis:   Moderate Risk (Score 3-4) - Pharmacological DVT Prophylaxis Ordered: enoxaparin (Lovenox).  Code Status: Level 1 - Full Code   Discussion with family: Updated  (daughter) at bedside.    Anticipated Length of Stay: Patient will be admitted on an observation basis with an anticipated length of stay of less than 2 midnights secondary to above.    History of Present Illness   Chief Complaint: Dizziness/spinning sensation starting yesterday with difficulty ambulating    Shelia Infante is a 74 y.o. female with a PMH of as below who presents with sudden onset of dizziness/spinning sensation that started yesterday when she was putting her eyedrops.  It lasted for few minutes and then resolved on its own but then later in the day again started having symptoms especially when she would get up and ambulate.  She went to sleep overnight  and this morning was still having similar symptoms when she would stand up or change position.  Was having difficulty ambulating and had to hold onto things for safe ambulation.  Also reported nausea.  She has history of positional vertigo but says that its usually not this severe and its unilateral and not associated with nausea.    On my evaluation reported significant improvement after getting Valium and meclizine in the ED.  She lives by herself.    Review of Systems   Constitutional:  Negative for chills, fatigue and fever.   HENT:  Negative for congestion and sore throat.    Respiratory:  Negative for cough and shortness of breath.    Cardiovascular:  Negative for chest pain and palpitations.   Gastrointestinal:  Negative for abdominal pain, nausea and vomiting.   Genitourinary:  Negative for difficulty urinating, flank pain, frequency and urgency.   Musculoskeletal:  Negative for arthralgias and myalgias.   Skin:  Negative for color change and rash.   Neurological:  Positive for dizziness. Negative for syncope, facial asymmetry, speech difficulty, numbness and headaches.   Psychiatric/Behavioral:  Negative for agitation, behavioral problems and confusion.        Historical Information   Past Medical History:   Diagnosis Date    Diverticulitis large intestine several years    Hyperlipidemia      Past Surgical History:   Procedure Laterality Date    APPENDECTOMY      BREAST SURGERY      lrft breast bx    HYSTERECTOMY      TONSILLECTOMY       Social History     Tobacco Use    Smoking status: Never    Smokeless tobacco: Never   Vaping Use    Vaping status: Never Used   Substance and Sexual Activity    Alcohol use: Yes    Drug use: Never    Sexual activity: Not on file     E-Cigarette/Vaping    E-Cigarette Use Never User      E-Cigarette/Vaping Substances     History reviewed. No pertinent family history.  Social History:  Marital Status:    Occupation:   Patient Pre-hospital Living Situation:   Patient  Pre-hospital Level of Mobility:   Patient Pre-hospital Diet Restrictions:     Meds/Allergies   I have reviewed home medications with patient personally.  Prior to Admission medications    Medication Sig Start Date End Date Taking? Authorizing Provider   atorvastatin (LIPITOR) 10 mg tablet Take 10 mg by mouth daily    Historical Provider, MD   escitalopram (LEXAPRO) 20 mg tablet Take 20 mg by mouth daily    Historical Provider, MD   lidocaine (LIDODERM) 5 % Apply 1 patch topically daily Remove & Discard patch within 12 hours or as directed by MD 9/14/21   Cornelia Jordan PA-C   methocarbamol (ROBAXIN) 500 mg tablet Take 1 tablet (500 mg total) by mouth daily at bedtime as needed for muscle spasms (back pain) 9/14/21   Cornelia Jordan PA-C   multivitamin (THERAGRAN) TABS Take 1 tablet by mouth daily    Historical Provider, MD     No Known Allergies    Objective :  Temp:  [98.3 °F (36.8 °C)] 98.3 °F (36.8 °C)  HR:  [67] 67  BP: (157)/(77) 157/77  Resp:  [16] 16  SpO2:  [99 %] 99 %  O2 Device: None (Room air)    Physical Exam   Constitutional: Pt appears comfortable. Not in any acute distress.  HENT:   Head: Normocephalic and atraumatic.   Eyes: EOM are normal. No nystagmus  Neck: Neck supple.   Cardiovascular: Normal rate, regular rhythm, normal heart sounds.  No murmur heard.  Pulmonary/Chest: Effort normal, air entry b/l equal. No respiratory distress. Pt has no wheezes or crackles.   Abdominal: Soft. Non-distended, Non-tender. Bowel sounds are normal.   Neurological: awake, alert. 5/5 motor strength in all extremities.  No numbness.  No other neurological deficits.  Psychiatric: normal mood and affect.      Lines/Drains:            Lab Results: I have reviewed the following results:  Results from last 7 days   Lab Units 12/10/24  0745   WBC Thousand/uL 5.33   HEMOGLOBIN g/dL 13.0   HEMATOCRIT % 38.5   PLATELETS Thousands/uL 251   SEGS PCT % 65   LYMPHO PCT % 26   MONO PCT % 8   EOS PCT % 1     Results from last 7  "days   Lab Units 12/10/24  0745   SODIUM mmol/L 140   POTASSIUM mmol/L 3.7   CHLORIDE mmol/L 106   CO2 mmol/L 26   BUN mg/dL 20   CREATININE mg/dL 0.75   ANION GAP mmol/L 8   CALCIUM mg/dL 9.8   ALBUMIN g/dL 4.2   TOTAL BILIRUBIN mg/dL 0.64   ALK PHOS U/L 65   ALT U/L 28   AST U/L 27   GLUCOSE RANDOM mg/dL 105             No results found for: \"HGBA1C\"              Administrative Statements       ** Please Note: This note has been constructed using a voice recognition system. **    "

## 2024-12-10 NOTE — ED PROVIDER NOTES
Time reflects when diagnosis was documented in both MDM as applicable and the Disposition within this note       Time User Action Codes Description Comment    12/10/2024 10:33 AM Silas Dhaliwal [R42] Dizziness     12/10/2024 10:33 AM Silas Dhaliwal [R26.2] Ambulatory dysfunction           ED Disposition       ED Disposition   Admit    Condition   Stable    Date/Time   Tue Dec 10, 2024 10:33 AM    Comment   Case was discussed with Dr. Wolfe and the patient's admission status was agreed to be Admission Status: observation status to the service of Dr. Wolfe .               Assessment & Plan       Medical Decision Making  See ED course    Amount and/or Complexity of Data Reviewed  Labs: ordered. Decision-making details documented in ED Course.  Radiology: ordered and independent interpretation performed.    Risk  Prescription drug management.  Decision regarding hospitalization.        ED Course as of 12/10/24 1152   Tue Dec 10, 2024   0818 hs TnI 0hr: 4   0825 74-year-old otherwise healthy female presenting for dizziness.  Onset of dizziness yesterday when she extended her head to put in eyedrops.  Continues to have dizziness with movement.  No dizziness when completely still.  Feels as though the room is spinning with no tunnel vision, presyncope like symptoms.  No hearing loss.  Has some nausea/vomiting secondary to dizziness.  Denies pain.  No recent illnesses.  No fevers, chest pain, shortness of breath, abdominal pain, change in bladder/bowel function, or other acute symptoms.   0827 Suspect peripheral vertigo given positional nature and benign exam.  However, given patient's age will obtain CTA head and neck for evaluation of posterior symptoms.   0827 Meclizine and Valium for dizziness.   1021 IMPRESSION:     CT Brain: No acute intracranial abnormality. Mild chronic microangiopathic change.     CT Angiography: Hypoplastic left vertebral artery. No stenosis or occlusion of the cervical or  intracranial vasculature.     Other findings: Smooth reversal of the normal cervical lordosis with mild cervical degenerative change.     1034 On reevaluation, vertigo marginally improved.  However, unable to stand and walk without significant dizziness.  High fall risk.  Discussed with medicine will admit for observation.       Medications   sodium chloride (PF) 0.9 % injection 3 mL (has no administration in time range)   meclizine (ANTIVERT) tablet 25 mg (has no administration in time range)   escitalopram (LEXAPRO) tablet 20 mg (has no administration in time range)   methocarbamol (ROBAXIN) tablet 500 mg (has no administration in time range)   atorvastatin (LIPITOR) tablet 10 mg (has no administration in time range)   multivitamin stress formula tablet 1 tablet (has no administration in time range)   enoxaparin (LOVENOX) subcutaneous injection 40 mg (has no administration in time range)   ondansetron (FOR EMS ONLY) (ZOFRAN) 4 mg/2 mL injection 4 mg (0 mg Does not apply Given to EMS 12/10/24 0737)   meclizine (ANTIVERT) tablet 25 mg (25 mg Oral Given 12/10/24 0742)   diazepam (VALIUM) injection 5 mg (5 mg Intravenous Given 12/10/24 0829)   sodium chloride 0.9 % bolus 1,000 mL (1,000 mL Intravenous New Bag 12/10/24 0831)   iohexol (OMNIPAQUE) 350 MG/ML injection (MULTI-DOSE) 85 mL (85 mL Intravenous Given 12/10/24 0923)       ED Risk Strat Scores                                               History of Present Illness       Chief Complaint   Patient presents with    Dizziness     Pt reports put her head back to place eye drops yesterday and became dizzy. Pt continues to be dizzy with movement       Past Medical History:   Diagnosis Date    Diverticulitis large intestine several years    Hyperlipidemia       Past Surgical History:   Procedure Laterality Date    APPENDECTOMY      BREAST SURGERY      lrft breast bx    HYSTERECTOMY      TONSILLECTOMY        History reviewed. No pertinent family history.   Social  History     Tobacco Use    Smoking status: Never    Smokeless tobacco: Never   Vaping Use    Vaping status: Never Used   Substance Use Topics    Alcohol use: Yes    Drug use: Never      E-Cigarette/Vaping    E-Cigarette Use Never User       E-Cigarette/Vaping Substances      I have reviewed and agree with the history as documented.     74-year-old otherwise healthy female presenting for dizziness.  Onset of dizziness yesterday when she extended her head to put in eyedrops.  Continues to have dizziness with movement.  No dizziness when completely still.  Feels as though the room is spinning with no tunnel vision, presyncope like symptoms.  No hearing loss.  Has some nausea/vomiting secondary to dizziness.  Denies pain.  No recent illnesses.  No fevers, chest pain, shortness of breath, abdominal pain, change in bladder/bowel function, or other acute symptoms.      Dizziness  Associated symptoms: no chest pain, no palpitations, no shortness of breath and no vomiting        Review of Systems   Constitutional:  Negative for fever.   HENT:  Negative for congestion and rhinorrhea.    Eyes:  Negative for visual disturbance.   Respiratory:  Negative for cough and shortness of breath.    Cardiovascular:  Negative for chest pain and palpitations.   Gastrointestinal:  Negative for abdominal pain and vomiting.   Genitourinary:  Negative for dysuria and hematuria.   Musculoskeletal:  Positive for gait problem. Negative for arthralgias and back pain.   Skin:  Negative for color change and rash.   Neurological:  Positive for dizziness. Negative for seizures and syncope.   All other systems reviewed and are negative.          Objective       ED Triage Vitals [12/10/24 0716]   Temperature Pulse Blood Pressure Respirations SpO2 Patient Position - Orthostatic VS   98.3 °F (36.8 °C) 67 157/77 16 99 % Sitting      Temp Source Heart Rate Source BP Location FiO2 (%) Pain Score    Oral -- Right arm -- --      Vitals      Date and Time Temp  Pulse SpO2 Resp BP Pain Score FACES Pain Rating User   12/10/24 0716 98.3 °F (36.8 °C) 67 99 % 16 157/77 -- -- RLN            Physical Exam  Vitals and nursing note reviewed.   Constitutional:       General: She is not in acute distress.     Appearance: She is well-developed.   HENT:      Head: Normocephalic and atraumatic.   Eyes:      Conjunctiva/sclera: Conjunctivae normal.   Cardiovascular:      Rate and Rhythm: Normal rate and regular rhythm.      Heart sounds: No murmur heard.  Pulmonary:      Effort: Pulmonary effort is normal. No respiratory distress.      Breath sounds: Normal breath sounds. No rhonchi.   Abdominal:      Palpations: Abdomen is soft.      Tenderness: There is no abdominal tenderness.   Musculoskeletal:         General: No swelling.      Cervical back: Neck supple.   Skin:     General: Skin is warm and dry.      Capillary Refill: Capillary refill takes less than 2 seconds.   Neurological:      General: No focal deficit present.      Mental Status: She is alert and oriented to person, place, and time.      Cranial Nerves: No cranial nerve deficit.      Sensory: No sensory deficit.      Motor: No weakness.      Coordination: Coordination normal.      Comments: Notable dizziness with sitting and standing.  Unable to ambulate bedside secondary to dizziness.   Psychiatric:         Mood and Affect: Mood normal.         Results Reviewed       Procedure Component Value Units Date/Time    HS Troponin 0hr (reflex protocol) [[id number]]  (Normal) Collected: 12/10/24 0745    Lab Status: Final result Specimen: Blood from Line, Venous Updated: 12/10/24 0818     hs TnI 0hr 4 ng/L     Comprehensive metabolic panel [[id number]] Collected: 12/10/24 0745    Lab Status: Final result Specimen: Blood from Line, Venous Updated: 12/10/24 0813     Sodium 140 mmol/L      Potassium 3.7 mmol/L      Chloride 106 mmol/L      CO2 26 mmol/L      ANION GAP 8 mmol/L      BUN 20 mg/dL      Creatinine 0.75 mg/dL      Glucose 105  mg/dL      Calcium 9.8 mg/dL      AST 27 U/L      ALT 28 U/L      Alkaline Phosphatase 65 U/L      Total Protein 7.0 g/dL      Albumin 4.2 g/dL      Total Bilirubin 0.64 mg/dL      eGFR 78 ml/min/1.73sq m     Narrative:      National Kid[employer] Foundation guidelines for Chronic Kidney Disease (CKD):     Stage 1 with normal or high GFR (GFR > 90 mL/min/1.73 square meters)    Stage 2 Mild CKD (GFR = 60-89 mL/min/1.73 square meters)    Stage 3A Moderate CKD (GFR = 45-59 mL/min/1.73 square meters)    Stage 3B Moderate CKD (GFR = 30-44 mL/min/1.73 square meters)    Stage 4 Severe CKD (GFR = 15-29 mL/min/1.73 square meters)    Stage 5 End Stage CKD (GFR <15 mL/min/1.73 square meters)  Note: GFR calculation is accurate only with a steady state creatinine    CBC and differential [[id number]] Collected: 12/10/24 0745    Lab Status: Final result Specimen: Blood from Line, Venous Updated: 12/10/24 0756     WBC 5.33 Thousand/uL      RBC 4.12 Million/uL      Hemoglobin 13.0 g/dL      Hematocrit 38.5 %      MCV 93 fL      MCH 31.6 pg      MCHC 33.8 g/dL      RDW 12.3 %      MPV 9.5 fL      Platelets 251 Thousands/uL      nRBC 0 /100 WBCs      Segmented % 65 %      Immature Grans % 0 %      Lymphocytes % 26 %      Monocytes % 8 %      Eosinophils Relative 1 %      Basophils Relative 0 %      Absolute Neutrophils 3.44 Thousands/µL      Absolute Immature Grans 0.02 Thousand/uL      Absolute Lymphocytes 1.38 Thousands/µL      Absolute Monocytes 0.40 Thousand/µL      Eosinophils Absolute 0.07 Thousand/µL      Basophils Absolute 0.02 Thousands/µL             CTA head and neck with and without contrast   Final Interpretation by Jon Infante DO (12/10 1000)      CT Brain: No acute intracranial abnormality. Mild chronic microangiopathic change.      CT Angiography: Hypoplastic left vertebral artery. No stenosis or occlusion of the cervical or intracranial vasculature.      Other findings: Smooth reversal of the normal  cervical lordosis with mild cervical degenerative change.                  Workstation performed: [id number]         X-ray chest 1 view portable   ED Interpretation by Silas Dhaliwal MD (12/10 0828)   No acute cardiopulmonary findings.      Final Interpretation by Luis Arana MD (12/10 7083)      No focal consolidation, pleural effusion, or pneumothorax.            Resident: Tyson Galeano I, the attending radiologist, have reviewed the images and agree with the final report above.      Workstation performed: [id number]             ECG 12 Lead Documentation Only    Date/Time: 12/10/2024 11:51 AM    Performed by: Silas hDaliwal MD  Authorized by: Silas Dhaliwal MD    ECG reviewed by me, the ED Provider: yes    Patient location:  ED  Previous ECG:     Previous ECG:  Unavailable  Interpretation:     Interpretation: normal    Rate:     ECG rate assessment: normal    Rhythm:     Rhythm: sinus rhythm    Ectopy:     Ectopy: none    QRS:     QRS axis:  Normal    QRS intervals:  Normal  Conduction:     Conduction: normal    ST segments:     ST segments:  Normal  T waves:     T waves: normal        ED Medication and Procedure Management   Prior to Admission Medications   Prescriptions Last Dose Informant Patient Reported? Taking?   atorvastatin (LIPITOR) 10 mg tablet  Self Yes No   Sig: Take 10 mg by mouth daily   escitalopram (LEXAPRO) 20 mg tablet  Self Yes No   Sig: Take 20 mg by mouth daily   lidocaine (LIDODERM) 5 %   No No   Sig: Apply 1 patch topically daily Remove & Discard patch within 12 hours or as directed by MD   methocarbamol (ROBAXIN) 500 mg tablet   No No   Sig: Take 1 tablet (500 mg total) by mouth daily at bedtime as needed for muscle spasms (back pain)   multivitamin (THERAGRAN) TABS  Self Yes No   Sig: Take 1 tablet by mouth daily      Facility-Administered Medications: None     Patient's Medications   Discharge Prescriptions    No medications on file     No discharge procedures on file.  ED SEPSIS  DOCUMENTATION   Time reflects when diagnosis was documented in both MDM as applicable and the Disposition within this note       Time User Action Codes Description Comment    12/10/2024 10:33 AM Silas Dhaliwal [R42] Dizziness     12/10/2024 10:33 AM Silas Dhaliwal [R26.2] Ambulatory dysfunction                  Silas Dhaliwal MD  12/10/24 9267

## 2024-12-11 VITALS
BODY MASS INDEX: 28.94 KG/M2 | OXYGEN SATURATION: 94 % | RESPIRATION RATE: 18 BRPM | DIASTOLIC BLOOD PRESSURE: 60 MMHG | TEMPERATURE: 97.8 F | SYSTOLIC BLOOD PRESSURE: 143 MMHG | WEIGHT: 163.36 LBS | HEART RATE: 64 BPM

## 2024-12-11 LAB
ATRIAL RATE: 65 BPM
ATRIAL RATE: 69 BPM
ATRIAL RATE: 88 BPM
P AXIS: 31 DEGREES
P AXIS: 37 DEGREES
P AXIS: 44 DEGREES
PR INTERVAL: 166 MS
PR INTERVAL: 168 MS
PR INTERVAL: 180 MS
QRS AXIS: -5 DEGREES
QRS AXIS: 4 DEGREES
QRS AXIS: 4 DEGREES
QRSD INTERVAL: 74 MS
QRSD INTERVAL: 78 MS
QRSD INTERVAL: 82 MS
QT INTERVAL: 386 MS
QT INTERVAL: 418 MS
QT INTERVAL: 448 MS
QTC INTERVAL: 434 MS
QTC INTERVAL: 467 MS
QTC INTERVAL: 480 MS
T WAVE AXIS: 12 DEGREES
T WAVE AXIS: 31 DEGREES
T WAVE AXIS: 33 DEGREES
VENTRICULAR RATE: 65 BPM
VENTRICULAR RATE: 69 BPM
VENTRICULAR RATE: 88 BPM

## 2024-12-11 PROCEDURE — 99238 HOSP IP/OBS DSCHRG MGMT 30/<: CPT | Performed by: HOSPITALIST

## 2024-12-11 PROCEDURE — 93010 ELECTROCARDIOGRAM REPORT: CPT | Performed by: INTERNAL MEDICINE

## 2024-12-11 RX ORDER — MECLIZINE HYDROCHLORIDE 25 MG/1
25 TABLET ORAL EVERY 8 HOURS PRN
Qty: 30 TABLET | Refills: 0 | Status: SHIPPED | OUTPATIENT
Start: 2024-12-11

## 2024-12-11 RX ADMIN — MECLIZINE 25 MG: 12.5 TABLET ORAL at 05:18

## 2024-12-11 RX ADMIN — ESCITALOPRAM OXALATE 10 MG: 10 TABLET ORAL at 08:51

## 2024-12-11 RX ADMIN — B-COMPLEX W/ C & FOLIC ACID TAB 1 TABLET: TAB at 08:51

## 2024-12-11 NOTE — DISCHARGE INSTR - AVS FIRST PAGE
Dear Shelia Infante,     It was our pleasure to care for you here at [facility].  It is our hope that we were always able to exceed the expected standards for your care during your stay.  You were hospitalized due to dizziness/vertigo.  You were cared for on the first floor by Vikash Peoples MD under the service of Go Mchugh MD with the [facility] who covers for your primary care physician (PCP), Dominick Stock MD, while you were hospitalized.  If you have any questions or concerns related to this hospitalization, you may contact us at .  For follow up as well as any medication refills, we recommend that you follow up with your primary care physician.  A registered nurse will reach out to you by phone within a few days after your discharge to answer any additional questions that you may have after going home.  However, at this time we provide for you here, the most important instructions / recommendations at discharge:       Notable Medication Adjustments -   Please take meclizine (Antivert) 25 mg as needed  Testing Required after Discharge -   None  Important follow up information -   Please follow-up with your PCP within a week of discharge  Other Instructions -   Please take all your medications regularly as directed  If symptoms return/persist contact your PCP if unable then visit to nearest ER    Please review this entire after visit summary as additional general instructions including medication list, appointments, activity, diet, any pertinent wound care, and other additional recommendations from your care team that may be provided for you.      Sincerely,   Vikash Peoples MD

## 2024-12-11 NOTE — DISCHARGE SUMMARY
Discharge Summary - Hospitalist   Name: Shelia Infante 74 y.o. female I MRN: [id number]  Unit/Bed#: -01 I Date of Admission: 12/10/2024   Date of Service: 12/11/2024 I Hospital Day: 0     Assessment & Plan  Vertigo  Assessment:  Patient presenting with 2 days of intermittent positional spinning sensation with difficulty ambulating and nausea.  No other neurological deficits  Significant improvement in symptoms with meclizine and Valium in ED.  Symptoms improved this morning.  She is able to walk to the bathroom without significant dizziness.  Patient lives by herself.  CTA head and neck without any acute abnormalities.  Suspect patient's symptoms most likely from peripheral vertigo given known history.    Plan:  Meclizine 25 mg as needed.  PT for vestibular therapy.  Hyperlipidemia  Continue statin.  Mood disorder (HCC)  Continue Lexapro  Ambulatory dysfunction  Related to vertigo.  PT evaluation.     Medical Problems       Resolved Problems  Date Reviewed: 1/20/2021   None       Discharging Physician / Practitioner: Vikash Peoples MD  PCP: Dominick Stock MD  Admission Date:   Admission Orders (From admission, onward)       Ordered        12/10/24 1034  Place in Observation  Once                          Discharge Date: 12/11/24    Consultations During Hospital Stay:  None    Procedures Performed:   None    Significant Findings / Test Results:   None    Incidental Findings:   Mild chronic microangiopathic change  Hypoplastic left vertebral artery  I reviewed the above mentioned incidental findings with the patient and/or family and they expressed understanding.    Test Results Pending at Discharge (will require follow up):   None     Outpatient Tests Requested:  None    Complications: None    Reason for Admission: Dizziness    Hospital Course:   Shelia Infante is a 74 y.o. female patient with known positional vertigo who originally presented to the hospital on 12/10/2024 due to dizziness which improved with  meclizine.  Patient feels improved and better today on 12/11.  She would like to go home since she is ambulating well without significant difficulty.  We recommend meclizine 25 mg every 8 hours as needed.  We also gave her referral for vestibular physical therapy.      Condition at Discharge: good    Discharge Day Visit / Exam:   Subjective:    Patient seen and examined at bedside today.  There were no overnight events.  Reports feeling improved today.  We discussed the use of meclizine as needed for symptoms  Otherwise denies any new symptoms.    Vitals: Blood Pressure: 143/60 (12/11/24 0700)  Pulse: 64 (12/11/24 0700)  Temperature: 97.8 °F (36.6 °C) (12/11/24 0700)  Temp Source: Oral (12/11/24 0700)  Respirations: 18 (12/11/24 0700)  Weight - Scale: 74.1 kg (163 lb 5.8 oz) (12/10/24 0716)  SpO2: 94 % (12/11/24 0700)  Physical Exam  Vitals and nursing note reviewed.   Constitutional:       General: She is not in acute distress.     Appearance: She is well-developed.   HENT:      Head: Normocephalic and atraumatic.   Eyes:      Conjunctiva/sclera: Conjunctivae normal.   Cardiovascular:      Rate and Rhythm: Normal rate and regular rhythm.      Heart sounds: No murmur heard.  Pulmonary:      Effort: Pulmonary effort is normal. No respiratory distress.      Breath sounds: Normal breath sounds.   Abdominal:      Palpations: Abdomen is soft.      Tenderness: There is no abdominal tenderness.   Musculoskeletal:         General: No swelling.   Skin:     General: Skin is warm and dry.      Capillary Refill: Capillary refill takes less than 2 seconds.   Neurological:      Mental Status: She is alert.   Psychiatric:         Mood and Affect: Mood normal.          Discussion with Family: Updated  (daughter) via phone.    Discharge instructions/Information to patient and family:   See after visit summary for information provided to patient and family.      Provisions for Follow-Up Care:  See after visit summary  for information related to follow-up care and any pertinent home health orders.      Mobility at time of Discharge:   Basic Mobility Inpatient Raw Score: 24  JH-HLM Goal: 8: Walk 250 feet or more  JH-HLM Achieved: 3: Sit at edge of bed  Patient reported being able to walk to and from the bathroom without significant difficulty.     Disposition:   Home    Planned Readmission: No    Discharge Medications:  See after visit summary for reconciled discharge medications provided to patient and/or family.      Administrative Statements   Discharge Statement:  I have spent a total time of 30 minutes in caring for this patient on the day of the visit/encounter. .    **Please Note: This note may have been constructed using a voice recognition system**

## 2024-12-11 NOTE — ASSESSMENT & PLAN NOTE
Assessment:  Patient presenting with 2 days of intermittent positional spinning sensation with difficulty ambulating and nausea.  No other neurological deficits  Significant improvement in symptoms with meclizine and Valium in ED.  Symptoms improved this morning.  She is able to walk to the bathroom without significant dizziness.  Patient lives by herself.  CTA head and neck without any acute abnormalities.  Suspect patient's symptoms most likely from peripheral vertigo given known history.    Plan:  Meclizine 25 mg as needed.  PT for vestibular therapy.